# Patient Record
Sex: FEMALE | Employment: FULL TIME | URBAN - METROPOLITAN AREA
[De-identification: names, ages, dates, MRNs, and addresses within clinical notes are randomized per-mention and may not be internally consistent; named-entity substitution may affect disease eponyms.]

---

## 2021-01-20 ENCOUNTER — TRANSCRIBE ORDER (OUTPATIENT)
Dept: SCHEDULING | Age: 36
End: 2021-01-20

## 2021-01-20 DIAGNOSIS — Z12.31 SCREENING MAMMOGRAM FOR HIGH-RISK PATIENT: Primary | ICD-10-CM

## 2021-02-03 ENCOUNTER — HOSPITAL ENCOUNTER (OUTPATIENT)
Dept: MAMMOGRAPHY | Age: 36
Discharge: HOME | End: 2021-02-03
Attending: INTERNAL MEDICINE
Payer: COMMERCIAL

## 2021-02-03 DIAGNOSIS — Z12.31 SCREENING MAMMOGRAM FOR HIGH-RISK PATIENT: ICD-10-CM

## 2021-02-03 PROCEDURE — 77067 SCR MAMMO BI INCL CAD: CPT

## 2022-02-03 PROBLEM — E55.9 VITAMIN D DEFICIENCY: Status: ACTIVE | Noted: 2019-04-18

## 2022-02-17 ENCOUNTER — HOSPITAL ENCOUNTER (OUTPATIENT)
Dept: MAMMOGRAPHY | Age: 37
Discharge: HOME | End: 2022-02-17
Attending: FAMILY MEDICINE
Payer: COMMERCIAL

## 2022-02-17 DIAGNOSIS — N63.10 BREAST MASS, RIGHT: ICD-10-CM

## 2022-02-17 DIAGNOSIS — Z80.3 FAMILY HISTORY OF BREAST CANCER IN FEMALE: ICD-10-CM

## 2022-02-17 DIAGNOSIS — N64.4 BREAST PAIN, RIGHT: ICD-10-CM

## 2022-02-17 PROCEDURE — 77066 DX MAMMO INCL CAD BI: CPT

## 2022-02-17 PROCEDURE — 76642 ULTRASOUND BREAST LIMITED: CPT

## 2022-03-08 PROBLEM — Z01.419 ENCOUNTER FOR ANNUAL ROUTINE GYNECOLOGICAL EXAMINATION: Status: ACTIVE | Noted: 2022-03-08

## 2022-03-13 PROBLEM — R87.612 LGSIL ON PAP SMEAR OF CERVIX: Status: ACTIVE | Noted: 2022-03-13

## 2022-03-18 PROBLEM — E55.9 VITAMIN D DEFICIENCY: Status: ACTIVE | Noted: 2019-04-18

## 2022-03-19 PROBLEM — R87.612 LGSIL ON PAP SMEAR OF CERVIX: Status: ACTIVE | Noted: 2022-03-13

## 2022-03-19 PROBLEM — Z01.419 ENCOUNTER FOR ANNUAL ROUTINE GYNECOLOGICAL EXAMINATION: Status: ACTIVE | Noted: 2022-03-08

## 2022-04-07 PROBLEM — Z01.419 ENCOUNTER FOR ANNUAL ROUTINE GYNECOLOGICAL EXAMINATION: Status: RESOLVED | Noted: 2022-03-08 | Resolved: 2022-04-07

## 2022-04-21 PROCEDURE — 88305 TISSUE EXAM BY PATHOLOGIST: CPT

## 2022-04-22 ENCOUNTER — HOSPITAL ENCOUNTER (OUTPATIENT)
Dept: LAB | Age: 37
Discharge: HOME | End: 2022-04-22

## 2023-02-16 ENCOUNTER — OFFICE VISIT (OUTPATIENT)
Dept: OCCUPATIONAL MEDICINE | Age: 38
End: 2023-02-16

## 2023-02-16 VITALS
HEART RATE: 76 BPM | TEMPERATURE: 98.4 F | DIASTOLIC BLOOD PRESSURE: 78 MMHG | OXYGEN SATURATION: 99 % | SYSTOLIC BLOOD PRESSURE: 110 MMHG | RESPIRATION RATE: 12 BRPM

## 2023-02-16 DIAGNOSIS — J02.9 SORE THROAT: Primary | ICD-10-CM

## 2023-02-16 DIAGNOSIS — J30.1 SEASONAL ALLERGIC RHINITIS DUE TO POLLEN: ICD-10-CM

## 2023-02-16 DIAGNOSIS — J06.9 VIRAL UPPER RESPIRATORY TRACT INFECTION: ICD-10-CM

## 2023-02-16 PROBLEM — R87.612 LGSIL ON PAP SMEAR OF CERVIX: Status: ACTIVE | Noted: 2022-03-13

## 2023-02-16 PROBLEM — E66.9 OBESITY: Status: ACTIVE | Noted: 2023-02-16

## 2023-02-16 PROBLEM — L03.032 PARONYCHIA OF GREAT TOE, LEFT: Status: ACTIVE | Noted: 2022-05-13

## 2023-02-16 LAB
GROUP A STREP ANTIGEN, POC: NEGATIVE
VALID INTERNAL CONTROL, POC: NORMAL

## 2023-02-16 RX ORDER — FLUTICASONE PROPIONATE 50 MCG
2 SPRAY, SUSPENSION (ML) NASAL DAILY
Qty: 16 G | Refills: 0 | Status: SHIPPED | OUTPATIENT
Start: 2023-02-16

## 2023-02-16 RX ORDER — CETIRIZINE HYDROCHLORIDE 10 MG/1
10 TABLET ORAL DAILY
Qty: 90 TABLET | Refills: 1 | Status: SHIPPED | OUTPATIENT
Start: 2023-02-16 | End: 2023-08-15

## 2023-02-16 ASSESSMENT — ENCOUNTER SYMPTOMS
WHEEZING: 0
SINUS PRESSURE: 0
SHORTNESS OF BREATH: 0
RHINORRHEA: 1
SINUS PAIN: 0
SWOLLEN GLANDS: 1
SORE THROAT: 1
COUGH: 1
CHEST TIGHTNESS: 0

## 2023-02-16 NOTE — PROGRESS NOTES
PROGRESS NOTE    SUBJECTIVE:   Vincenzo Gonzalez is a 44 y.o. female who complains of coryza, sore throat, myalgias, and headache for 2 days. She denies a history of anorexia, chest pain, chills, dizziness, fatigue, fevers, nausea, shortness of breath, sweats, and vomiting and denies a history of asthma. Patient does not smoke cigarettes. Pt states her son was sick last week but was negative for COVID. Pt also tested herself for COVID which was negative as well. Pt has been using a humidifier at home and did try nyquil at home which helped her sleep. URI   This is a new problem. The current episode started in the past 7 days. The problem has been unchanged. There has been no fever. Associated symptoms include congestion, coughing, headaches, rhinorrhea, sneezing, a sore throat and swollen glands. Pertinent negatives include no ear pain, sinus pain or wheezing. Treatments tried: Nyquil. The treatment provided mild relief. Current Outpatient Medications   Medication Sig Dispense Refill    cetirizine (ZYRTEC) 10 MG tablet Take 1 tablet by mouth daily 90 tablet 1    fluticasone (FLONASE) 50 MCG/ACT nasal spray 2 sprays by Each Nostril route daily 16 g 0    metFORMIN (GLUCOPHAGE) 500 MG tablet Take 500 mg by mouth 2 times daily       No current facility-administered medications for this visit. No Known Allergies  Social History     Tobacco Use    Smoking status: Never    Smokeless tobacco: Never   Substance Use Topics    Alcohol use: Yes       Review of Systems   Constitutional:  Negative for activity change, appetite change, chills, diaphoresis, fatigue and fever. HENT:  Positive for congestion, postnasal drip, rhinorrhea, sneezing and sore throat. Negative for ear discharge, ear pain, sinus pressure and sinus pain. Respiratory:  Positive for cough. Negative for chest tightness, shortness of breath and wheezing. Musculoskeletal:  Positive for myalgias. Neurological:  Positive for headaches. OBJECTIVE:  /78 (Site: Left Upper Arm)   Pulse 76   Temp 98.4 °F (36.9 °C) (Oral)   Resp 12   SpO2 99%      Physical Exam  Constitutional:       General: She is awake. Appearance: Normal appearance. She is well-developed and well-groomed. HENT:      Head: Normocephalic and atraumatic. Right Ear: Hearing, ear canal and external ear normal. A middle ear effusion is present. Left Ear: Hearing, ear canal and external ear normal. A middle ear effusion is present. Nose: Rhinorrhea present. Rhinorrhea is clear. Right Turbinates: Swollen and pale. Left Turbinates: Swollen and pale. Right Sinus: No maxillary sinus tenderness or frontal sinus tenderness. Left Sinus: No maxillary sinus tenderness or frontal sinus tenderness. Mouth/Throat:      Lips: Pink. Mouth: Mucous membranes are moist.      Pharynx: Uvula midline. Posterior oropharyngeal erythema present. Tonsils: No tonsillar exudate. 1+ on the right. 1+ on the left. Cardiovascular:      Rate and Rhythm: Normal rate and regular rhythm. Heart sounds: S1 normal and S2 normal.   Pulmonary:      Effort: Pulmonary effort is normal.      Breath sounds: Normal breath sounds and air entry. Lymphadenopathy:      Head:      Right side of head: Submandibular adenopathy present. No submental, tonsillar, preauricular or posterior auricular adenopathy. Left side of head: Submandibular adenopathy present. No submental, tonsillar, preauricular or posterior auricular adenopathy. Neurological:      Mental Status: She is alert. Psychiatric:         Behavior: Behavior is cooperative. AMB POC RAPID STREP A    Collection Time: 02/16/23  9:25 AM   Result Value Ref Range    Valid Internal Control, POC VALID     Group A Strep Antigen, POC Negative Negative         ASSESSMENT and PLAN    Diagnoses and all orders for this visit:    Sore throat  -     AMB POC RAPID STREP A  Pt negative for strep.      Seasonal allergic rhinitis due to pollen  -     cetirizine (ZYRTEC) 10 MG tablet; Take 1 tablet by mouth daily  -     fluticasone (FLONASE) 50 MCG/ACT nasal spray; 2 sprays by Each Nostril route daily    Viral upper respiratory tract infection    Patient Education:  May use cool-mist humidifier/Vaporizer at bedside/living quarters. Recommended saline rinses with OceanMist spray or using NettiPot. Encouraged increase fluid intake for adequate hydration. Reviewed good hand hygiene. Tylenol/Ibuprofen for aches/pains. Throat lozenges, honey, or warm salt water gargles for sore throat. Informed symptoms should last for 5-7 days but may last up to 2 weeks. Cough may persist after cold symptoms resolve. Risks and benefits of medications reviewed with patient . Follow up: 5 days if not any better or worse or development of high fever. Seek ER care for chest pain or SOB. Patient voices understanding and agrees with the plan of care as described above. Counseled on benefits of having a primary care provider which includes, but is not limited to, continuity of care and having a medical home when concerns arise. Also enforced that onsite clinic policy states that we are not to take the place of a primary care provider, pt verbalized understanding. SEs and risk vs benefits associated with medications prescribed discussed with patient who verbalized understanding. Pt verbalized understanding and agreement with plan of care. RTC for persisting/worsening symptoms or new complaints that arise. Discussed signs and symptoms that would warrant immediate evaluation including, but not limited to HA, blurred vision, speech disturbance, difficulty with ambulation/gait, numbness, tingling, weakness, syncope, chest pain, or shortness of breath. I have reviewed the patient's medication list, past medical, family, social, and surgical history in detail and updated the patient record appropriately.     MIKA Lainez - NP

## 2023-04-03 NOTE — PROGRESS NOTES
Normal appearance. She is well-developed and well-groomed. HENT:      Head: Normocephalic and atraumatic. Right Ear: Hearing, tympanic membrane, ear canal and external ear normal.      Left Ear: Hearing, tympanic membrane, ear canal and external ear normal.      Nose: No congestion or rhinorrhea. Right Turbinates: Pale. Left Turbinates: Pale. Right Sinus: No frontal sinus tenderness. Left Sinus: No frontal sinus tenderness. Mouth/Throat:      Lips: Pink. Mouth: Mucous membranes are moist.      Pharynx: Oropharynx is clear. Uvula midline. Tonsils: No tonsillar exudate. 0 on the right. 0 on the left. Eyes:      General: Lids are normal. Lids are everted, no foreign bodies appreciated. Vision grossly intact. Gaze aligned appropriately. Allergic shiner present. Right eye: No discharge. Left eye: No discharge. Extraocular Movements: Extraocular movements intact. Conjunctiva/sclera:      Right eye: Right conjunctiva is injected. Left eye: Left conjunctiva is injected. Cardiovascular:      Rate and Rhythm: Normal rate and regular rhythm. Heart sounds: S1 normal and S2 normal.   Pulmonary:      Effort: Pulmonary effort is normal.      Breath sounds: Normal breath sounds and air entry. Lymphadenopathy:      Head:      Right side of head: Submandibular adenopathy present. No tonsillar, preauricular or posterior auricular adenopathy. Left side of head: Submandibular adenopathy present. No tonsillar, preauricular or posterior auricular adenopathy. Neurological:      Mental Status: She is alert. Psychiatric:         Behavior: Behavior is cooperative. ASSESSMENT and PLAN    Diagnoses and all orders for this visit:    Allergic conjunctivitis of both eyes  -     olopatadine (PATANOL) 0.1 % ophthalmic solution; Place 1 drop into both eyes 2 times daily  Ophthalmic drops per orders. Antihistamines per orders.   Warm compress to

## 2023-04-04 ENCOUNTER — OFFICE VISIT (OUTPATIENT)
Dept: OCCUPATIONAL MEDICINE | Age: 38
End: 2023-04-04

## 2023-04-04 DIAGNOSIS — H10.13 ALLERGIC CONJUNCTIVITIS OF BOTH EYES: Primary | ICD-10-CM

## 2023-04-04 RX ORDER — OLOPATADINE HYDROCHLORIDE 1 MG/ML
1 SOLUTION/ DROPS OPHTHALMIC 2 TIMES DAILY
Qty: 5 ML | Refills: 1 | Status: SHIPPED | OUTPATIENT
Start: 2023-04-04 | End: 2023-05-04

## 2023-04-04 ASSESSMENT — ENCOUNTER SYMPTOMS
EYE DISCHARGE: 0
PHOTOPHOBIA: 0
DOUBLE VISION: 0
FOREIGN BODY SENSATION: 0
EYE REDNESS: 0
BLURRED VISION: 0
EYE PAIN: 0
EYE ITCHING: 1

## 2023-04-04 ASSESSMENT — VISUAL ACUITY: OU: 1

## 2024-02-20 ENCOUNTER — TRANSCRIBE ORDERS (OUTPATIENT)
Dept: SCHEDULING | Age: 39
End: 2024-02-20

## 2024-02-20 DIAGNOSIS — Z12.31 SCREENING MAMMOGRAM FOR HIGH-RISK PATIENT: Primary | ICD-10-CM

## 2024-03-20 ENCOUNTER — HOSPITAL ENCOUNTER (OUTPATIENT)
Dept: MAMMOGRAPHY | Age: 39
Discharge: HOME | End: 2024-03-23
Payer: COMMERCIAL

## 2024-03-20 DIAGNOSIS — Z12.31 SCREENING MAMMOGRAM FOR HIGH-RISK PATIENT: ICD-10-CM

## 2024-03-20 PROCEDURE — 77063 BREAST TOMOSYNTHESIS BI: CPT

## 2024-03-20 PROCEDURE — 77067 SCR MAMMO BI INCL CAD: CPT

## 2025-01-31 ENCOUNTER — TRANSCRIBE ORDERS (OUTPATIENT)
Dept: SCHEDULING | Age: 40
End: 2025-01-31

## 2025-01-31 DIAGNOSIS — Z12.31 VISIT FOR SCREENING MAMMOGRAM: Primary | ICD-10-CM

## 2025-03-21 ENCOUNTER — HOSPITAL ENCOUNTER (OUTPATIENT)
Dept: MAMMOGRAPHY | Age: 40
Discharge: HOME | End: 2025-03-24
Payer: COMMERCIAL

## 2025-03-21 VITALS — WEIGHT: 190 LBS | BODY MASS INDEX: 34.96 KG/M2 | HEIGHT: 62 IN

## 2025-03-21 DIAGNOSIS — Z12.31 VISIT FOR SCREENING MAMMOGRAM: ICD-10-CM

## 2025-03-21 PROCEDURE — 77067 SCR MAMMO BI INCL CAD: CPT

## 2025-03-21 PROCEDURE — 77063 BREAST TOMOSYNTHESIS BI: CPT
